# Patient Record
Sex: MALE | Race: WHITE | NOT HISPANIC OR LATINO
[De-identification: names, ages, dates, MRNs, and addresses within clinical notes are randomized per-mention and may not be internally consistent; named-entity substitution may affect disease eponyms.]

---

## 2017-04-12 ENCOUNTER — RECORD ABSTRACTING (OUTPATIENT)
Age: 82
End: 2017-04-12

## 2017-04-12 ENCOUNTER — APPOINTMENT (OUTPATIENT)
Dept: THORACIC SURGERY | Facility: CLINIC | Age: 82
End: 2017-04-12

## 2017-04-12 VITALS
DIASTOLIC BLOOD PRESSURE: 63 MMHG | SYSTOLIC BLOOD PRESSURE: 120 MMHG | HEART RATE: 60 BPM | BODY MASS INDEX: 25.62 KG/M2 | OXYGEN SATURATION: 98 % | RESPIRATION RATE: 16 BRPM | WEIGHT: 183 LBS | HEIGHT: 71 IN

## 2017-04-12 DIAGNOSIS — Z82.0 FAMILY HISTORY OF EPILEPSY AND OTHER DISEASES OF THE NERVOUS SYSTEM: ICD-10-CM

## 2017-04-12 DIAGNOSIS — D36.10 BENIGN NEOPLASM OF PERIPHERAL NERVES AND AUTONOMIC NERVOUS SYSTEM, UNSPECIFIED: ICD-10-CM

## 2017-04-12 DIAGNOSIS — R91.1 SOLITARY PULMONARY NODULE: ICD-10-CM

## 2017-04-13 ENCOUNTER — TRANSCRIPTION ENCOUNTER (OUTPATIENT)
Age: 82
End: 2017-04-13

## 2017-05-05 ENCOUNTER — OUTPATIENT (OUTPATIENT)
Dept: OUTPATIENT SERVICES | Facility: HOSPITAL | Age: 82
LOS: 1 days | End: 2017-05-05
Payer: MEDICARE

## 2017-05-05 VITALS
HEIGHT: 69.5 IN | SYSTOLIC BLOOD PRESSURE: 150 MMHG | HEART RATE: 58 BPM | TEMPERATURE: 98 F | DIASTOLIC BLOOD PRESSURE: 80 MMHG | RESPIRATION RATE: 14 BRPM | WEIGHT: 192.9 LBS

## 2017-05-05 DIAGNOSIS — Z98.890 OTHER SPECIFIED POSTPROCEDURAL STATES: Chronic | ICD-10-CM

## 2017-05-05 DIAGNOSIS — R91.1 SOLITARY PULMONARY NODULE: ICD-10-CM

## 2017-05-05 DIAGNOSIS — Z90.49 ACQUIRED ABSENCE OF OTHER SPECIFIED PARTS OF DIGESTIVE TRACT: Chronic | ICD-10-CM

## 2017-05-05 DIAGNOSIS — H26.9 UNSPECIFIED CATARACT: Chronic | ICD-10-CM

## 2017-05-05 LAB
ALBUMIN SERPL ELPH-MCNC: 4.4 G/DL — SIGNIFICANT CHANGE UP (ref 3.3–5)
ALP SERPL-CCNC: 101 U/L — SIGNIFICANT CHANGE UP (ref 40–120)
ALT FLD-CCNC: 19 U/L — SIGNIFICANT CHANGE UP (ref 4–41)
AST SERPL-CCNC: 25 U/L — SIGNIFICANT CHANGE UP (ref 4–40)
BILIRUB SERPL-MCNC: 0.9 MG/DL — SIGNIFICANT CHANGE UP (ref 0.2–1.2)
BLD GP AB SCN SERPL QL: NEGATIVE — SIGNIFICANT CHANGE UP
BUN SERPL-MCNC: 14 MG/DL — SIGNIFICANT CHANGE UP (ref 7–23)
CALCIUM SERPL-MCNC: 9.7 MG/DL — SIGNIFICANT CHANGE UP (ref 8.4–10.5)
CHLORIDE SERPL-SCNC: 101 MMOL/L — SIGNIFICANT CHANGE UP (ref 98–107)
CO2 SERPL-SCNC: 25 MMOL/L — SIGNIFICANT CHANGE UP (ref 22–31)
CREAT SERPL-MCNC: 1.07 MG/DL — SIGNIFICANT CHANGE UP (ref 0.5–1.3)
GLUCOSE SERPL-MCNC: 101 MG/DL — HIGH (ref 70–99)
HCT VFR BLD CALC: 46.7 % — SIGNIFICANT CHANGE UP (ref 39–50)
HGB BLD-MCNC: 16 G/DL — SIGNIFICANT CHANGE UP (ref 13–17)
MCHC RBC-ENTMCNC: 31 PG — SIGNIFICANT CHANGE UP (ref 27–34)
MCHC RBC-ENTMCNC: 34.3 % — SIGNIFICANT CHANGE UP (ref 32–36)
MCV RBC AUTO: 90.5 FL — SIGNIFICANT CHANGE UP (ref 80–100)
PLATELET # BLD AUTO: 198 K/UL — SIGNIFICANT CHANGE UP (ref 150–400)
PMV BLD: 11.2 FL — SIGNIFICANT CHANGE UP (ref 7–13)
POTASSIUM SERPL-MCNC: 4.5 MMOL/L — SIGNIFICANT CHANGE UP (ref 3.5–5.3)
POTASSIUM SERPL-SCNC: 4.5 MMOL/L — SIGNIFICANT CHANGE UP (ref 3.5–5.3)
PROT SERPL-MCNC: 7.2 G/DL — SIGNIFICANT CHANGE UP (ref 6–8.3)
RBC # BLD: 5.16 M/UL — SIGNIFICANT CHANGE UP (ref 4.2–5.8)
RBC # FLD: 14.4 % — SIGNIFICANT CHANGE UP (ref 10.3–14.5)
RH IG SCN BLD-IMP: POSITIVE — SIGNIFICANT CHANGE UP
SODIUM SERPL-SCNC: 143 MMOL/L — SIGNIFICANT CHANGE UP (ref 135–145)
WBC # BLD: 6.06 K/UL — SIGNIFICANT CHANGE UP (ref 3.8–10.5)
WBC # FLD AUTO: 6.06 K/UL — SIGNIFICANT CHANGE UP (ref 3.8–10.5)

## 2017-05-05 PROCEDURE — 93010 ELECTROCARDIOGRAM REPORT: CPT

## 2017-05-05 RX ORDER — SODIUM CHLORIDE 9 MG/ML
1000 INJECTION, SOLUTION INTRAVENOUS
Qty: 0 | Refills: 0 | Status: DISCONTINUED | OUTPATIENT
Start: 2017-05-15 | End: 2017-05-16

## 2017-05-05 NOTE — H&P PST ADULT - MARITAL STATUS
/2 children, mother  40y/o breast cancer, father  86y/o CVA, 4 siblings, 1 sister  copd, 1 brother  ALS, 2 brothers  MI

## 2017-05-05 NOTE — H&P PST ADULT - NEGATIVE BREAST SYMPTOMS
no breast tenderness L/no breast lump R/no breast lump L/no nipple discharge L/no breast tenderness R/no nipple discharge R

## 2017-05-05 NOTE — H&P PST ADULT - PROBLEM SELECTOR PLAN 1
Pt scheduled for right video assisted thoracoscopy, right lower lobe wedge resection, possible thoracotomy, possible right lower lobectomy, mediastinal lymph node dissection on 5/15/2017  labs done results pending, ekg done.  Pt went for medical clearance,   Hibiclens provided.  Preop teaching done, pt able to verbalize understanding. Pt scheduled for right video assisted thoracoscopy, right lower lobe wedge resection, possible thoracotomy, possible right lower lobectomy, mediastinal lymph node dissection on 5/15/2017  labs done results pending, ekg done.  Pt went for medical clearance,  scheduled for echo 5/11/2017, Hibiclens provided.  Preop teaching done, pt able to verbalize understanding.

## 2017-05-05 NOTE — H&P PST ADULT - NEGATIVE ENMT SYMPTOMS
no gum bleeding/no tinnitus/no nasal discharge/no ear pain/no throat pain/no vertigo/no sinus symptoms/no nose bleeds/no post-nasal discharge/no abnormal taste sensation/no dysphagia/no dry mouth/no recurrent cold sores/no nasal congestion/no nasal obstruction

## 2017-05-05 NOTE — H&P PST ADULT - PMH
Macular degeneration    Malignant neoplasm of colon  denieds chemo and radiation 9/2015  Solitary pulmonary nodule Macular degeneration    Malignant neoplasm of colon  denies chemo and radiation 9/2015  Solitary pulmonary nodule

## 2017-05-05 NOTE — H&P PST ADULT - GASTROINTESTINAL DETAILS
no guarding/no masses palpable/nontender/soft/no rigidity/no bruit/no rebound tenderness/no distention/bowel sounds normal/no organomegaly

## 2017-05-05 NOTE — H&P PST ADULT - ABILITY TO HEAR (WITH HEARING AID OR HEARING APPLIANCE IF NORMALLY USED):
hearing/Mildly to Moderately Impaired: difficulty hearing in some environments or speaker may need to increase volume or speak distinctly

## 2017-05-05 NOTE — H&P PST ADULT - NEGATIVE GENERAL GENITOURINARY SYMPTOMS
no hematuria/no dysuria/no flank pain L/no flank pain R/no urinary hesitancy/normal urinary frequency/no bladder infections

## 2017-05-05 NOTE — H&P PST ADULT - HISTORY OF PRESENT ILLNESS
80y/o male scheduled for right video assisted thoracoscopy, right lower lobe wedge resection, possible thoracotomy, possible right lower lobectomy, mediastinal lymph node dissection on 5/15/2017.  Pt states, "hx colon cancer, s/p colon resection 2015, denies chemo and radiation, at that time right nodule was identified.  F/u cat scan 11/2016, 4/2017 shows nodule increased in size."

## 2017-05-05 NOTE — H&P PST ADULT - NEGATIVE OPHTHALMOLOGIC SYMPTOMS
no diplopia/no irritation L/no loss of vision R/no scleral injection L/no scleral injection R/no discharge R/no lacrimation R/no blurred vision L/no pain R/no photophobia/no lacrimation L/no irritation R/no blurred vision R/no loss of vision L/no discharge L/no pain L

## 2017-05-08 PROBLEM — C18.9 MALIGNANT NEOPLASM OF COLON, UNSPECIFIED: Chronic | Status: ACTIVE | Noted: 2017-05-05

## 2017-05-12 RX ORDER — HEPARIN SODIUM 5000 [USP'U]/ML
5000 INJECTION INTRAVENOUS; SUBCUTANEOUS ONCE
Qty: 0 | Refills: 0 | Status: COMPLETED | OUTPATIENT
Start: 2017-05-15 | End: 2017-05-15

## 2017-05-15 ENCOUNTER — OUTPATIENT (OUTPATIENT)
Dept: INPATIENT UNIT | Facility: HOSPITAL | Age: 82
LOS: 1 days | Discharge: HOME | End: 2017-05-15

## 2017-05-15 ENCOUNTER — APPOINTMENT (OUTPATIENT)
Dept: THORACIC SURGERY | Facility: HOSPITAL | Age: 82
End: 2017-05-15

## 2017-05-15 ENCOUNTER — TRANSCRIPTION ENCOUNTER (OUTPATIENT)
Age: 82
End: 2017-05-15

## 2017-05-15 ENCOUNTER — RESULT REVIEW (OUTPATIENT)
Age: 82
End: 2017-05-15

## 2017-05-15 VITALS
RESPIRATION RATE: 16 BRPM | WEIGHT: 192.9 LBS | TEMPERATURE: 98 F | SYSTOLIC BLOOD PRESSURE: 162 MMHG | HEIGHT: 69.5 IN | OXYGEN SATURATION: 98 % | HEART RATE: 61 BPM | DIASTOLIC BLOOD PRESSURE: 69 MMHG

## 2017-05-15 DIAGNOSIS — Z90.49 ACQUIRED ABSENCE OF OTHER SPECIFIED PARTS OF DIGESTIVE TRACT: Chronic | ICD-10-CM

## 2017-05-15 DIAGNOSIS — H26.9 UNSPECIFIED CATARACT: Chronic | ICD-10-CM

## 2017-05-15 DIAGNOSIS — R91.1 SOLITARY PULMONARY NODULE: ICD-10-CM

## 2017-05-15 DIAGNOSIS — Z98.890 OTHER SPECIFIED POSTPROCEDURAL STATES: Chronic | ICD-10-CM

## 2017-05-15 LAB
BASE EXCESS BLDA CALC-SCNC: 0.6 MMOL/L — SIGNIFICANT CHANGE UP
CA-I BLDA-SCNC: 1.22 MMOL/L — SIGNIFICANT CHANGE UP (ref 1.15–1.29)
GLUCOSE BLDA-MCNC: 126 MG/DL — HIGH (ref 70–99)
HCO3 BLDA-SCNC: 24 MMOL/L — SIGNIFICANT CHANGE UP (ref 22–26)
HCT VFR BLDA CALC: 45.4 % — SIGNIFICANT CHANGE UP (ref 39–51)
HGB BLDA-MCNC: 14.8 G/DL — SIGNIFICANT CHANGE UP (ref 13–17)
PCO2 BLDA: 50 MMHG — HIGH (ref 35–48)
PH BLDA: 7.33 PH — LOW (ref 7.35–7.45)
PO2 BLDA: 91 MMHG — SIGNIFICANT CHANGE UP (ref 83–108)
POTASSIUM BLDA-SCNC: 3.8 MMOL/L — SIGNIFICANT CHANGE UP (ref 3.4–4.5)
RH IG SCN BLD-IMP: POSITIVE — SIGNIFICANT CHANGE UP
SAO2 % BLDA: 97 % — SIGNIFICANT CHANGE UP (ref 95–99)
SODIUM BLDA-SCNC: 137 MMOL/L — SIGNIFICANT CHANGE UP (ref 136–146)

## 2017-05-15 RX ORDER — ALBUTEROL 90 UG/1
2.5 AEROSOL, METERED ORAL EVERY 6 HOURS
Qty: 0 | Refills: 0 | Status: DISCONTINUED | OUTPATIENT
Start: 2017-05-15 | End: 2017-05-16

## 2017-05-15 RX ORDER — ONDANSETRON 8 MG/1
4 TABLET, FILM COATED ORAL EVERY 6 HOURS
Qty: 0 | Refills: 0 | Status: DISCONTINUED | OUTPATIENT
Start: 2017-05-15 | End: 2017-05-16

## 2017-05-15 RX ORDER — IPRATROPIUM BROMIDE 0.2 MG/ML
500 SOLUTION, NON-ORAL INHALATION EVERY 6 HOURS
Qty: 0 | Refills: 0 | Status: DISCONTINUED | OUTPATIENT
Start: 2017-05-15 | End: 2017-05-16

## 2017-05-15 RX ORDER — NALOXONE HYDROCHLORIDE 4 MG/.1ML
0.1 SPRAY NASAL
Qty: 0 | Refills: 0 | Status: DISCONTINUED | OUTPATIENT
Start: 2017-05-15 | End: 2017-05-16

## 2017-05-15 RX ORDER — HEPARIN SODIUM 5000 [USP'U]/ML
5000 INJECTION INTRAVENOUS; SUBCUTANEOUS EVERY 8 HOURS
Qty: 0 | Refills: 0 | Status: DISCONTINUED | OUTPATIENT
Start: 2017-05-15 | End: 2017-05-16

## 2017-05-15 RX ORDER — FAMOTIDINE 10 MG/ML
20 INJECTION INTRAVENOUS EVERY 12 HOURS
Qty: 0 | Refills: 0 | Status: DISCONTINUED | OUTPATIENT
Start: 2017-05-15 | End: 2017-05-16

## 2017-05-15 RX ORDER — OMEGA-3 ACID ETHYL ESTERS 1 G
1 CAPSULE ORAL
Qty: 0 | Refills: 0 | COMMUNITY

## 2017-05-15 RX ORDER — SENNA PLUS 8.6 MG/1
2 TABLET ORAL AT BEDTIME
Qty: 0 | Refills: 0 | Status: DISCONTINUED | OUTPATIENT
Start: 2017-05-15 | End: 2017-05-16

## 2017-05-15 RX ORDER — LUTEIN 20 MG
1 CAPSULE ORAL
Qty: 0 | Refills: 0 | COMMUNITY

## 2017-05-15 RX ORDER — HYDROMORPHONE HYDROCHLORIDE 2 MG/ML
0.5 INJECTION INTRAMUSCULAR; INTRAVENOUS; SUBCUTANEOUS
Qty: 0 | Refills: 0 | Status: DISCONTINUED | OUTPATIENT
Start: 2017-05-15 | End: 2017-05-16

## 2017-05-15 RX ORDER — DOCUSATE SODIUM 100 MG
100 CAPSULE ORAL THREE TIMES A DAY
Qty: 0 | Refills: 0 | Status: DISCONTINUED | OUTPATIENT
Start: 2017-05-15 | End: 2017-05-16

## 2017-05-15 RX ORDER — CHOLECALCIFEROL (VITAMIN D3) 125 MCG
1 CAPSULE ORAL
Qty: 0 | Refills: 0 | COMMUNITY

## 2017-05-15 RX ORDER — HYDROMORPHONE HYDROCHLORIDE 2 MG/ML
30 INJECTION INTRAMUSCULAR; INTRAVENOUS; SUBCUTANEOUS
Qty: 0 | Refills: 0 | Status: DISCONTINUED | OUTPATIENT
Start: 2017-05-15 | End: 2017-05-16

## 2017-05-15 RX ADMIN — FAMOTIDINE 20 MILLIGRAM(S): 10 INJECTION INTRAVENOUS at 18:45

## 2017-05-15 RX ADMIN — Medication 500 MICROGRAM(S): at 22:50

## 2017-05-15 RX ADMIN — HEPARIN SODIUM 5000 UNIT(S): 5000 INJECTION INTRAVENOUS; SUBCUTANEOUS at 21:56

## 2017-05-15 RX ADMIN — SODIUM CHLORIDE 30 MILLILITER(S): 9 INJECTION, SOLUTION INTRAVENOUS at 07:04

## 2017-05-15 RX ADMIN — SODIUM CHLORIDE 30 MILLILITER(S): 9 INJECTION, SOLUTION INTRAVENOUS at 21:56

## 2017-05-15 RX ADMIN — ALBUTEROL 2.5 MILLIGRAM(S): 90 AEROSOL, METERED ORAL at 22:50

## 2017-05-15 RX ADMIN — ALBUTEROL 2.5 MILLIGRAM(S): 90 AEROSOL, METERED ORAL at 16:10

## 2017-05-15 RX ADMIN — HEPARIN SODIUM 5000 UNIT(S): 5000 INJECTION INTRAVENOUS; SUBCUTANEOUS at 07:04

## 2017-05-15 RX ADMIN — HYDROMORPHONE HYDROCHLORIDE 0.5 MILLIGRAM(S): 2 INJECTION INTRAMUSCULAR; INTRAVENOUS; SUBCUTANEOUS at 15:30

## 2017-05-15 RX ADMIN — Medication 500 MICROGRAM(S): at 16:10

## 2017-05-15 RX ADMIN — Medication 100 MILLIGRAM(S): at 18:45

## 2017-05-15 RX ADMIN — HYDROMORPHONE HYDROCHLORIDE 30 MILLILITER(S): 2 INJECTION INTRAMUSCULAR; INTRAVENOUS; SUBCUTANEOUS at 18:48

## 2017-05-15 RX ADMIN — Medication 100 MILLIGRAM(S): at 21:56

## 2017-05-15 NOTE — ASU PATIENT PROFILE, ADULT - PMH
Macular degeneration    Malignant neoplasm of colon  denies chemo and radiation 9/2015  Solitary pulmonary nodule

## 2017-05-15 NOTE — PATIENT PROFILE ADULT. - ABILITY TO HEAR (WITH HEARING AID OR HEARING APPLIANCE IF NORMALLY USED):
Mildly to Moderately Impaired: difficulty hearing in some environments or speaker may need to increase volume or speak distinctly/hearing

## 2017-05-15 NOTE — BRIEF OPERATIVE NOTE - PROCEDURE
VATS (video-assisted thoracoscopic surgery)  05/15/2017  right lower lobe wedge resection  Active  [id number]

## 2017-05-16 ENCOUNTER — TRANSCRIPTION ENCOUNTER (OUTPATIENT)
Age: 82
End: 2017-05-16

## 2017-05-16 VITALS
OXYGEN SATURATION: 97 % | SYSTOLIC BLOOD PRESSURE: 105 MMHG | DIASTOLIC BLOOD PRESSURE: 61 MMHG | HEART RATE: 87 BPM | TEMPERATURE: 98 F | RESPIRATION RATE: 20 BRPM

## 2017-05-16 LAB
BASOPHILS # BLD AUTO: 0.01 K/UL — SIGNIFICANT CHANGE UP (ref 0–0.2)
BASOPHILS NFR BLD AUTO: 0.1 % — SIGNIFICANT CHANGE UP (ref 0–2)
BUN SERPL-MCNC: 13 MG/DL — SIGNIFICANT CHANGE UP (ref 7–23)
CALCIUM SERPL-MCNC: 9.1 MG/DL — SIGNIFICANT CHANGE UP (ref 8.4–10.5)
CHLORIDE SERPL-SCNC: 98 MMOL/L — SIGNIFICANT CHANGE UP (ref 98–107)
CO2 SERPL-SCNC: 37 MMOL/L — HIGH (ref 22–31)
CREAT SERPL-MCNC: 0.93 MG/DL — SIGNIFICANT CHANGE UP (ref 0.5–1.3)
EOSINOPHIL # BLD AUTO: 0 K/UL — SIGNIFICANT CHANGE UP (ref 0–0.5)
EOSINOPHIL NFR BLD AUTO: 0 % — SIGNIFICANT CHANGE UP (ref 0–6)
GLUCOSE SERPL-MCNC: 150 MG/DL — HIGH (ref 70–99)
HCT VFR BLD CALC: 43.3 % — SIGNIFICANT CHANGE UP (ref 39–50)
HGB BLD-MCNC: 15 G/DL — SIGNIFICANT CHANGE UP (ref 13–17)
IMM GRANULOCYTES NFR BLD AUTO: 0.2 % — SIGNIFICANT CHANGE UP (ref 0–1.5)
LYMPHOCYTES # BLD AUTO: 1.54 K/UL — SIGNIFICANT CHANGE UP (ref 1–3.3)
LYMPHOCYTES # BLD AUTO: 16.3 % — SIGNIFICANT CHANGE UP (ref 13–44)
MCHC RBC-ENTMCNC: 31.1 PG — SIGNIFICANT CHANGE UP (ref 27–34)
MCHC RBC-ENTMCNC: 34.6 % — SIGNIFICANT CHANGE UP (ref 32–36)
MCV RBC AUTO: 89.6 FL — SIGNIFICANT CHANGE UP (ref 80–100)
MONOCYTES # BLD AUTO: 1.15 K/UL — HIGH (ref 0–0.9)
MONOCYTES NFR BLD AUTO: 12.2 % — SIGNIFICANT CHANGE UP (ref 2–14)
NEUTROPHILS # BLD AUTO: 6.7 K/UL — SIGNIFICANT CHANGE UP (ref 1.8–7.4)
NEUTROPHILS NFR BLD AUTO: 71.2 % — SIGNIFICANT CHANGE UP (ref 43–77)
PLATELET # BLD AUTO: 182 K/UL — SIGNIFICANT CHANGE UP (ref 150–400)
PMV BLD: 11.4 FL — SIGNIFICANT CHANGE UP (ref 7–13)
POTASSIUM SERPL-MCNC: 4.6 MMOL/L — SIGNIFICANT CHANGE UP (ref 3.5–5.3)
POTASSIUM SERPL-SCNC: 4.6 MMOL/L — SIGNIFICANT CHANGE UP (ref 3.5–5.3)
RBC # BLD: 4.83 M/UL — SIGNIFICANT CHANGE UP (ref 4.2–5.8)
RBC # FLD: 14.4 % — SIGNIFICANT CHANGE UP (ref 10.3–14.5)
SODIUM SERPL-SCNC: 137 MMOL/L — SIGNIFICANT CHANGE UP (ref 135–145)
WBC # BLD: 9.42 K/UL — SIGNIFICANT CHANGE UP (ref 3.8–10.5)
WBC # FLD AUTO: 9.42 K/UL — SIGNIFICANT CHANGE UP (ref 3.8–10.5)

## 2017-05-16 RX ORDER — ACETAMINOPHEN 500 MG
650 TABLET ORAL EVERY 6 HOURS
Qty: 0 | Refills: 0 | Status: DISCONTINUED | OUTPATIENT
Start: 2017-05-16 | End: 2017-05-16

## 2017-05-16 RX ORDER — SENNA PLUS 8.6 MG/1
2 TABLET ORAL
Qty: 0 | Refills: 0 | COMMUNITY
Start: 2017-05-16

## 2017-05-16 RX ORDER — OXYCODONE HYDROCHLORIDE 5 MG/1
1 TABLET ORAL
Qty: 30 | Refills: 0 | OUTPATIENT
Start: 2017-05-16 | End: 2017-05-21

## 2017-05-16 RX ORDER — DOCUSATE SODIUM 100 MG
1 CAPSULE ORAL
Qty: 0 | Refills: 0 | COMMUNITY
Start: 2017-05-16

## 2017-05-16 RX ORDER — OXYCODONE HYDROCHLORIDE 5 MG/1
5 TABLET ORAL EVERY 4 HOURS
Qty: 0 | Refills: 0 | Status: DISCONTINUED | OUTPATIENT
Start: 2017-05-16 | End: 2017-05-16

## 2017-05-16 RX ORDER — OXYCODONE HYDROCHLORIDE 5 MG/1
10 TABLET ORAL EVERY 4 HOURS
Qty: 0 | Refills: 0 | Status: DISCONTINUED | OUTPATIENT
Start: 2017-05-16 | End: 2017-05-16

## 2017-05-16 RX ORDER — ACETAMINOPHEN 500 MG
2 TABLET ORAL
Qty: 0 | Refills: 0 | COMMUNITY
Start: 2017-05-16

## 2017-05-16 RX ADMIN — Medication 500 MICROGRAM(S): at 09:15

## 2017-05-16 RX ADMIN — ALBUTEROL 2.5 MILLIGRAM(S): 90 AEROSOL, METERED ORAL at 09:15

## 2017-05-16 RX ADMIN — SODIUM CHLORIDE 30 MILLILITER(S): 9 INJECTION, SOLUTION INTRAVENOUS at 05:02

## 2017-05-16 RX ADMIN — ALBUTEROL 2.5 MILLIGRAM(S): 90 AEROSOL, METERED ORAL at 04:01

## 2017-05-16 RX ADMIN — HEPARIN SODIUM 5000 UNIT(S): 5000 INJECTION INTRAVENOUS; SUBCUTANEOUS at 05:00

## 2017-05-16 RX ADMIN — Medication 500 MICROGRAM(S): at 15:47

## 2017-05-16 RX ADMIN — Medication 500 MICROGRAM(S): at 04:01

## 2017-05-16 RX ADMIN — Medication 100 MILLIGRAM(S): at 05:00

## 2017-05-16 RX ADMIN — HYDROMORPHONE HYDROCHLORIDE 30 MILLILITER(S): 2 INJECTION INTRAMUSCULAR; INTRAVENOUS; SUBCUTANEOUS at 07:15

## 2017-05-16 RX ADMIN — HYDROMORPHONE HYDROCHLORIDE 30 MILLILITER(S): 2 INJECTION INTRAMUSCULAR; INTRAVENOUS; SUBCUTANEOUS at 10:01

## 2017-05-16 RX ADMIN — FAMOTIDINE 20 MILLIGRAM(S): 10 INJECTION INTRAVENOUS at 05:00

## 2017-05-16 RX ADMIN — HEPARIN SODIUM 5000 UNIT(S): 5000 INJECTION INTRAVENOUS; SUBCUTANEOUS at 16:06

## 2017-05-16 RX ADMIN — SODIUM CHLORIDE 30 MILLILITER(S): 9 INJECTION, SOLUTION INTRAVENOUS at 07:16

## 2017-05-16 RX ADMIN — ALBUTEROL 2.5 MILLIGRAM(S): 90 AEROSOL, METERED ORAL at 15:47

## 2017-05-16 RX ADMIN — Medication 100 MILLIGRAM(S): at 16:06

## 2017-05-16 RX ADMIN — FAMOTIDINE 20 MILLIGRAM(S): 10 INJECTION INTRAVENOUS at 17:51

## 2017-05-16 NOTE — DISCHARGE NOTE ADULT - MEDICATION SUMMARY - MEDICATIONS TO TAKE
I will START or STAY ON the medications listed below when I get home from the hospital:    acetaminophen 325 mg oral tablet  -- 2 tab(s) by mouth every 6 hours, As needed, Mild Pain (1 - 3)  -- Indication: For pain control    oxyCODONE 5 mg oral tablet  -- 1 tab(s) by mouth every 4 hours, As needed, Moderate Pain (4 - 6) -for severe pain MDD:6  -- Indication: For pain control    docusate sodium 100 mg oral capsule  -- 1 cap(s) by mouth 3 times a day  -- Indication: For Stool softner as needed    senna oral tablet  -- 2 tab(s) by mouth once a day (at bedtime), As needed, Constipation  -- Indication: For laxative as needed    Omega-3  -- 1 tab(s) by mouth once a day  -- Indication: For Home medication    lutein 20 mg oral tablet  -- 1 tab(s) by mouth once a day  -- Indication: For Home medication    Vitamin D3  -- 1 tab(s) by mouth once a day  -- Indication: For Home medication

## 2017-05-16 NOTE — DISCHARGE NOTE ADULT - CARE PLAN
Principal Discharge DX:	Solitary pulmonary nodule  Goal:	s/p RVATS wedge resection  Instructions for follow-up, activity and diet:	as above

## 2017-05-16 NOTE — DISCHARGE NOTE ADULT - NS AS ACTIVITY OBS
Walking-Indoors allowed/Walking-Outdoors allowed/Showering allowed/Stairs allowed/No Heavy lifting/straining

## 2017-05-16 NOTE — DISCHARGE NOTE ADULT - PATIENT PORTAL LINK FT
“You can access the [facility] Patient Portal, offered by [facility], by registering with the following website: [email or web address]”

## 2017-05-16 NOTE — DISCHARGE NOTE ADULT - INSTRUCTIONS
no new restrictions You remove right chest dressing on 5/18, you may leave it open to air or place small bandaid over the site, Take medications as prescribed. Follow up with your doctor as directed. Shower daily with mild soap & water, pat sites dry. No wash cloth on incisions. No cream, lotion or oils on wounds. Increase activity slowly, as tolerated. Continue to use your spirometer and perform  your deep breathing & coughing exercises. No driving while on pain medication, no heavy lifting . Call your doctor for fever over 101 degrees, pain not relieved by pain medication or for any questions or concerns you may have. Please call 911 for chest pain, shortness of breath or for signs & symptoms of stroke. You may remove right chest dressing on 5/18, you may leave it open to air or place small bandaid over the site, Take medications as prescribed. Follow up with your doctor as directed. Shower daily with mild soap & water, pat sites dry. No wash cloth on incisions. No cream, lotion or oils on wounds. Increase activity slowly, as tolerated. Continue to use your spirometer and perform  your deep breathing & coughing exercises. No driving while on pain medication, no heavy lifting . Call your doctor for fever over 101 degrees, pain not relieved by pain medication or for any questions or concerns you may have. Please call 911 for chest pain, shortness of breath or for signs & symptoms of stroke.

## 2017-05-16 NOTE — DISCHARGE NOTE ADULT - CARE PROVIDER_API CALL
Alverto Gastelum (MD), Thoracic Surgery  [address] [place] [address]  Phone: [phone]  Fax: [phone]

## 2017-05-16 NOTE — DISCHARGE NOTE ADULT - HOSPITAL COURSE
82y/o male w PMHx colon CA s/p resection 2015 w R sided lung nodule scheduled for right video assisted thoracoscopy, right lower lobe wedge resection, mediastinal lymph node dissection on 5/15/2017.  Postop course routine. Pt stable for discharge home to follow up as an outpatient.

## 2017-05-18 LAB — SURGICAL PATHOLOGY STUDY: SIGNIFICANT CHANGE UP

## 2017-05-24 ENCOUNTER — APPOINTMENT (OUTPATIENT)
Dept: THORACIC SURGERY | Facility: CLINIC | Age: 82
End: 2017-05-24

## 2017-05-24 VITALS
SYSTOLIC BLOOD PRESSURE: 140 MMHG | HEART RATE: 62 BPM | DIASTOLIC BLOOD PRESSURE: 70 MMHG | WEIGHT: 183 LBS | RESPIRATION RATE: 16 BRPM | OXYGEN SATURATION: 98 % | HEIGHT: 71 IN | BODY MASS INDEX: 25.62 KG/M2

## 2017-06-01 NOTE — ASU PREOP CHECKLIST - HEIGHT IN CM
Pt lying in bed sleeping. No signs/symptoms of pain or distress. Family updated upon pt arrival to recovery room.   176.53

## 2017-08-23 ENCOUNTER — APPOINTMENT (OUTPATIENT)
Dept: THORACIC SURGERY | Facility: CLINIC | Age: 82
End: 2017-08-23
Payer: MEDICARE

## 2017-08-23 VITALS
HEART RATE: 72 BPM | BODY MASS INDEX: 25.62 KG/M2 | HEIGHT: 71 IN | RESPIRATION RATE: 16 BRPM | OXYGEN SATURATION: 100 % | WEIGHT: 183 LBS | SYSTOLIC BLOOD PRESSURE: 143 MMHG | DIASTOLIC BLOOD PRESSURE: 78 MMHG

## 2017-08-23 PROCEDURE — 99214 OFFICE O/P EST MOD 30 MIN: CPT

## 2017-08-23 RX ORDER — AMOXICILLIN 500 MG/1
500 CAPSULE ORAL
Qty: 21 | Refills: 0 | Status: COMPLETED | COMMUNITY
Start: 2017-05-19

## 2017-08-23 RX ORDER — OXYCODONE 5 MG/1
5 TABLET ORAL
Qty: 30 | Refills: 0 | Status: COMPLETED | COMMUNITY
Start: 2017-05-16

## 2019-12-04 PROBLEM — H35.30 UNSPECIFIED MACULAR DEGENERATION: Chronic | Status: ACTIVE | Noted: 2017-05-05

## 2019-12-04 PROBLEM — R91.1 SOLITARY PULMONARY NODULE: Chronic | Status: ACTIVE | Noted: 2017-05-05

## 2019-12-10 ENCOUNTER — APPOINTMENT (OUTPATIENT)
Dept: VASCULAR SURGERY | Facility: CLINIC | Age: 84
End: 2019-12-10
Payer: MEDICARE

## 2019-12-10 VITALS
WEIGHT: 182 LBS | HEART RATE: 73 BPM | BODY MASS INDEX: 25.48 KG/M2 | HEIGHT: 71 IN | DIASTOLIC BLOOD PRESSURE: 75 MMHG | SYSTOLIC BLOOD PRESSURE: 134 MMHG | TEMPERATURE: 97.9 F

## 2019-12-10 DIAGNOSIS — F17.200 NICOTINE DEPENDENCE, UNSPECIFIED, UNCOMPLICATED: ICD-10-CM

## 2019-12-10 DIAGNOSIS — K57.90 DIVERTICULOSIS OF INTESTINE, PART UNSPECIFIED, W/OUT PERFORATION OR ABSCESS W/OUT BLEEDING: ICD-10-CM

## 2019-12-10 DIAGNOSIS — C79.9 SECONDARY MALIGNANT NEOPLASM OF UNSPECIFIED SITE: ICD-10-CM

## 2019-12-10 PROCEDURE — 99202 OFFICE O/P NEW SF 15 MIN: CPT

## 2019-12-10 NOTE — HISTORY OF PRESENT ILLNESS
[FreeTextEntry1] : 84M s/p left hemicolectomy for colon Ca and left chest schwannoma excision, presents for evaluation of incidental finding SMA stenosis vs dissection. Patient has no abdominal pain, and is able to eat without pain. He feels fine otherwise. Current smoker.

## 2019-12-10 NOTE — ASSESSMENT
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 He remains asymptomatic. \par - ASA 81 daily \par - Smoking cessation \par - Follow up with PCP as scheduled, consider statin therapy   I spoke  to Dr Ramirez  regarding  our  findings  \par

## 2019-12-10 NOTE — PHYSICAL EXAM
[Normal Breath Sounds] : Normal breath sounds [Normal Heart Sounds] : normal heart sounds [2+] : left 2+ [] : not present [Ankle Swelling (On Exam)] : not present [Varicose Veins Of Lower Extremities] : not present [Tender] : nontender [Abdomen Tenderness] : ~T ~M No abdominal tenderness [Abdomen Masses] : No abdominal masses [de-identified] : well appearing

## 2020-12-04 NOTE — H&P PST ADULT - NEGATIVE CARDIOVASCULAR SYMPTOMS
HM EXTERNAL PAP/HPV UPDATED     no claudication/no orthopnea/no paroxysmal nocturnal dyspnea/no peripheral edema/no chest pain/no palpitations/no dyspnea on exertion

## 2022-10-04 NOTE — ASU PATIENT PROFILE, ADULT - NS PRO TALK SOMEONE YN
[FreeTextEntry1] : Megan Alberto DO\par [address] [facility] [address]\[place] [address]\par [phone]\par \par Dear Dr. Alberto,\par \par Reason for Visit: Proteinuria. Right hydrocele. BPH.\par \par This is a 70 year-old Cantonese speaking gentleman with history of renal stones and diabetes, presents with proteinuria. Patient previously underwent ureteroscopy for renal stone. He is seeing nephrologist Dr. David Caicedo. Patient was last seen by me over 3 years ago. Since he was last seen, patient was hospitalized for infection and developed right orchitis. The patient took a course of antibiotics and his pain resolved. However, he still has right hydrocele. Patient reports taking Flomax and Proscar regularly without any difficulties or side effects. He reports improvement of urinary symptoms on medical therapy. All other review of systems are negative. He has no cancer in his family medical history. He previously had gallbladder surgery. Past medical history, family history and social history were inquired and were noncontributory to current condition. The patient does not use tobacco or drink alcohol. Medications and allergies were reviewed. He has no known allergies to medication. \par \par On examination, the patient is a healthy-appearing gentleman in no acute distress. He is alert and oriented follows commands. He has normal mood and affect. He is normocephalic. Oral no thrush. Neck is supple. Respirations are unlabored. His abdomen is soft and nontender. Liver is nonpalpable. Bladder is nonpalpable. No CVA tenderness. Neurologically he is grossly intact. No peripheral edema. Skin without gross abnormality. He has normal male external genitalia. Normal meatus. Bilateral testes are descended intrascrotally and normal to palpation. On rectal examination, there is normal sphincter tone. The prostate is clinically benign without focal induration or nodularity. On examination, patient has right hydrocele.\par \par Assessment: Proteinuria.  Right hydrocele. BPH, stable on Flomax and Proscar.\par \par I counseled the patient. In terms of patient's right hydrocele, I recommended he obtain urinalysis, urine culture, and scrotal ultrasound. In terms of his BPH, patient reports stable symptoms on Flomax and Proscar. I renewed the patient's prescription for Flomax and Proscar today. I encouraged the patient to continue medications regularly as directed. In terms of his proteinuria, I recommended patient repeat 24 hour protein total. Risks and alternatives were discussed. I answered the patient questions. The patient will follow-up as directed and will contact me with any questions or concerns. Thank you for the opportunity to participate in the care of this patient. I'll keep you updated on his progress.\par \par Plan: Continue Flomax and Proscar. Urinalysis. Urine culture. Scrotal ultrasound. 24 hour protein total. Follow up in 1 month.
no

## 2022-10-19 NOTE — H&P PST ADULT - NEGATIVE GENERAL SYMPTOMS
Left message requesting a return call to discuss home monitor transmission   no chills/no anorexia/no weight loss/no fatigue/no polyphagia/no weight gain/no polyuria/no malaise/no sweating/no fever/no polydipsia

## 2023-01-23 NOTE — ASU PREOP CHECKLIST - IV STARTED
yes Helical Rim Advancement Flap Text: The defect edges were debeveled with a #15 blade scalpel.  Given the location of the defect and the proximity to free margins (helical rim) a double helical rim advancement flap was deemed most appropriate.  Using a sterile surgical marker, the appropriate advancement flaps were drawn incorporating the defect and placing the expected incisions between the helical rim and antihelix where possible.  The area thus outlined was incised through and through with a #15 scalpel blade.  With a skin hook and iris scissors, the flaps were gently and sharply undermined and freed up.

## 2023-12-25 NOTE — ASU PATIENT PROFILE, ADULT - TOBACCO USE
Presents to ER with c/o severe abdominal pain that started about 3 am. States hurts all across his abdomen. Unable to stay still during triage.  States last bm was yesterday. States drank a whole bottle of mylanta tonight. Had this type of pain about 2 months ago but did not get seen. Having periods of lethagy type actions during triage.   
Current every day smoker

## 2025-07-01 NOTE — H&P PST ADULT - RS GEN PE MLT RESP DETAILS PC
[facility] EMERGENCY DEPARTMENT  EMERGENCY DEPARTMENT ENCOUNTER      Pt Name: Michael Rockwell  MRN: [id number]  Birthdate 1965  Date of evaluation: 7/1/2025  Provider: Mateo Hook MD    CHIEF COMPLAINT       Chief Complaint   Patient presents with    Chest Pain         HISTORY OF PRESENT ILLNESS   (Location/Symptom, Timing/Onset, Context/Setting, Quality, Duration, Modifying Factors, Severity)  Note limiting factors.   Michael Rockwell is a 59 y.o. male who presents to the emergency department      The history is provided by the patient. No  was used.   Chest Pain  Pain location:  Substernal area  Pain quality: tightness    Pain radiates to:  Does not radiate  Pain severity:  Severe  Onset quality:  Sudden  Timing:  Constant  Progression:  Unchanged  Chronicity:  New  Ineffective treatments:  None tried  Associated symptoms: no abdominal pain, no back pain, no dizziness, no fever, no headache, no nausea, no numbness, no palpitations, no shortness of breath, no vomiting and no weakness        Nursing Notes were reviewed.    REVIEW OF SYSTEMS    (2-9 systems for level 4, 10 or more for level 5)     Review of Systems   Constitutional:  Negative for activity change, chills and fever.   HENT:  Negative for nosebleeds.    Eyes:  Negative for visual disturbance.   Respiratory:  Negative for shortness of breath.    Cardiovascular:  Positive for chest pain. Negative for palpitations.   Gastrointestinal:  Negative for abdominal pain, constipation, diarrhea, nausea and vomiting.   Genitourinary:  Negative for difficulty urinating, dysuria, hematuria and urgency.   Musculoskeletal:  Negative for back pain, neck pain and neck stiffness.   Skin:  Negative for color change.   Allergic/Immunologic: Negative for immunocompromised state.   Neurological:  Negative for dizziness, seizures, syncope, weakness, light-headedness, numbness and headaches.   Psychiatric/Behavioral:  Negative for behavioral 
respirations non-labored/no wheezes/breath sounds equal/good air movement/no rales/no intercostal retractions/no chest wall tenderness/no rhonchi/clear to auscultation bilaterally